# Patient Record
Sex: FEMALE
[De-identification: names, ages, dates, MRNs, and addresses within clinical notes are randomized per-mention and may not be internally consistent; named-entity substitution may affect disease eponyms.]

---

## 2019-10-30 ENCOUNTER — APPOINTMENT (OUTPATIENT)
Dept: ORTHOPEDIC SURGERY | Facility: CLINIC | Age: 54
End: 2019-10-30
Payer: COMMERCIAL

## 2019-10-30 ENCOUNTER — APPOINTMENT (OUTPATIENT)
Dept: ORTHOPEDIC SURGERY | Facility: CLINIC | Age: 54
End: 2019-10-30

## 2019-10-30 VITALS
HEIGHT: 65 IN | BODY MASS INDEX: 42.65 KG/M2 | WEIGHT: 256 LBS | DIASTOLIC BLOOD PRESSURE: 84 MMHG | SYSTOLIC BLOOD PRESSURE: 144 MMHG | HEART RATE: 76 BPM

## 2019-10-30 DIAGNOSIS — Z78.9 OTHER SPECIFIED HEALTH STATUS: ICD-10-CM

## 2019-10-30 PROBLEM — Z00.00 ENCOUNTER FOR PREVENTIVE HEALTH EXAMINATION: Noted: 2019-10-30

## 2019-10-30 PROBLEM — Z00.00 ENCOUNTER FOR PREVENTIVE HEALTH EXAMINATION: Status: ACTIVE | Noted: 2019-10-30

## 2019-10-30 PROCEDURE — 99204 OFFICE O/P NEW MOD 45 MIN: CPT

## 2019-10-30 RX ORDER — CHROMIUM 200 MCG
TABLET ORAL
Refills: 0 | Status: ACTIVE | COMMUNITY

## 2019-10-30 RX ORDER — DORZOLAMIDE HYDROCHLORIDE 20 MG/ML
SOLUTION OPHTHALMIC
Refills: 0 | Status: ACTIVE | COMMUNITY

## 2019-10-30 RX ORDER — AMLODIPINE BESYLATE 5 MG/1
TABLET ORAL
Refills: 0 | Status: ACTIVE | COMMUNITY

## 2019-10-30 NOTE — DISCUSSION/SUMMARY
[de-identified] : BIlateral Knee moderate degenerative joint disease \par The natural history and treatment of degenerative arthritis was discussed with the patient at length today. The spectrum of treatment including nonoperative modalities to surgical intervention was elucidated. Noninvasive and nonoperative treatment modalities include weight reduction, activity modification with low impact exercise,  as needed use of acetaminophen or anti-inflammatory medications if tolerated, glucosamine/chondroitin supplements, and physical therapy. Further treatments can include corticosteroid injection and the use of viscosupplementation with hyaluronic acid injections. Definitive surgical treatment can certainly include total joint arthroplasty also. The risks and benefits of each treatment options was discussed and all questions were answered.\par The patient was informed of the findings and recommended conservative management in the form of a home exercise program, activity modifications, stationary bicycling, swimming and weight loss program. A trial of Glucosamine and Chondroiten Sulphate was recommended.\par A prescription for a course of physical therapy was provided.\par A prescription for non-steroidal anti-inflammatory medication Diclofenac was provided and the risks, benefits and side effects were discussed.\par Follow-up appointment was recommended in 3 months.\par

## 2019-10-30 NOTE — PHYSICAL EXAM
[Antalgic] : antalgic [LE] : Sensory: Intact in bilateral lower extremities [Knee] : patellar 2+ and symmetric bilaterally [Ankle] : ankle 2+ and symmetric bilaterally [DP] : dorsalis pedis 2+ and symmetric bilaterally [PT] : posterior tibial 2+ and symmetric bilaterally [de-identified] : On general examination the patient is adequately groomed and nourished. The vital parameters are as recorded. \par There is no lymphedema or diffuse swelling, no varicose veins, no skin warmth/erythema/scars/swelling, no ulcers and no palpable lymph nodes or masses in both lower extremities. Bilateral pedal pulses are well palpable.\par Upper Extremity:\par Both right and left upper extremities are unremarkable in terms of skin rash, lesions, pigmentation, redness, tenderness, swelling, joint instability, abnormal deformity or crepitus. The overall range of motion, sensation, motor tone and strength testing are normal.\par \par Bilateral Knee Exam: \par The gait is bilateral stiff knee antalgic.\par Knee alignment:            Right 5 degrees varus with no flexion deformity. \par Left 5 degrees varus with no flexion deformity.\par Both knees demonstrate no scars and the skin has no warmth, erythema, swelling or tenderness. \par Both knees have a range of motion of\par Extension:                    Right 0 degrees     Left 0 degrees\par Flexion:                                   Right 110 degrees      Left 110 degrees\par There is right knee medial joint line tenderness, Left Knee Lateral joint line pain. There is bilateral knee mild effusion. \par Wyatt's test is positive. Mikel test is positive.\par Lachman's test, Anterior/Posterior Drawer test and Pivot Shift Tests are negative. \par There is bilateral knee grade 1 MCL mediolateral laxity and no anteroposterior instability. \par Patella compression test is positive and patellofemoral tracking is normal with no lateral subluxation, apprehension or instability. \par Right knee quadriceps and hamstrings power is 4+.\par Left knee quadriceps and hamstrings power is 4+.\par \par Hip Exam:\par The gait and station is normal\par The patient has equal leg lengths and no pelvic tilt. Gal/Trinity test is 7 inches on the right and 7 inches on the left. Active SLR is 60 degrees on the right and 60 degrees on the left. Both hips demonstrate no scars and the skin has no signs of inflammation or tenderness. \par Both Hips have a normal range of motion of flexion to 100 degrees, abduction 40 degrees, adduction 20 degrees, external rotation 40 degrees, internal rotation 20 degrees with symmetrical motion in flexion and extension. There is no flexion contracture, deformity or instability. Labral impingement tests are negative.\par Both hips flexor, abductor and extensor power is normal.\par \par Spine: There is no curvature of the spine or loss of normal lumbar lordosis. The skin is devoid of erythema, scars, pigmentation or rashes. There is no lumbar spasm and no tenderness especially at L4-L5 or L5-S1. \par The range of motion of the lumbar spine is normal and painless in flexion, extension, lateral flexion and rotation. There is no lumbar spine imbalance or instability detected.\par Bilateral passive SLR is 60 degrees in supine and sitting positions. Lasegue's Test is negative.\par Neurology:\par The patient is alert and oriented in person, place and time. The mood is calm and affect is normal.\par Testing for coordination including Rhomberg's Test and Finger-Nose Test, sensation, motor tone and power and deep tendon reflexes in both lower extremities is normal. [de-identified] : The following radiographs were taken recently at [facility] and read by me during this patients visit. I reviewed each radiograph in detail with the patient and discussed the findings as highlighted below. \par AP, lateral and skyline views of the bilateral knees confirm moderate degenerative joint disease with medial joint space narrowing and osteophyte formation, right worse than left on xray. \par

## 2019-10-30 NOTE — HISTORY OF PRESENT ILLNESS
[7] : an average pain level of 7/10 [de-identified] : Ms. VINNY VILLALOBOS is a 54 year old female presenting with bilateral knee pain, now worsening.  She localizes the pain to the medial and anterior aspect of the right knee, and to the lateral and anterior aspect of the left knee. The patient describes the pain as sharp, and states it is intermittent, based on activity. She states the pain is exacerbated by walking long distance, climbing/descending stairs, and rising from a seated position. She has been taking NSAIDs for pain with only mild temporary relief. She admits to prior conservative management inclusive of physical therapy with only mild improvement in condition. Patient has not had injections. She denies hip or lower back pain. The patient admits to limitations in their quality of life, and is present to discuss options for treatment. JTM. \par

## 2020-05-12 ENCOUNTER — RX RENEWAL (OUTPATIENT)
Age: 55
End: 2020-05-12

## 2020-05-12 RX ORDER — DICLOFENAC SODIUM 75 MG/1
75 TABLET, DELAYED RELEASE ORAL
Qty: 60 | Refills: 0 | Status: ACTIVE | COMMUNITY
Start: 2019-10-30 | End: 1900-01-01

## 2020-07-30 ENCOUNTER — APPOINTMENT (OUTPATIENT)
Dept: ORTHOPEDIC SURGERY | Facility: CLINIC | Age: 55
End: 2020-07-30
Payer: COMMERCIAL

## 2020-07-30 VITALS — TEMPERATURE: 97.8 F | HEART RATE: 89 BPM | SYSTOLIC BLOOD PRESSURE: 140 MMHG | DIASTOLIC BLOOD PRESSURE: 81 MMHG

## 2020-07-30 DIAGNOSIS — Z78.9 OTHER SPECIFIED HEALTH STATUS: ICD-10-CM

## 2020-07-30 PROCEDURE — 99214 OFFICE O/P EST MOD 30 MIN: CPT

## 2020-07-30 PROCEDURE — 72170 X-RAY EXAM OF PELVIS: CPT

## 2020-07-30 PROCEDURE — 73562 X-RAY EXAM OF KNEE 3: CPT | Mod: 50

## 2020-07-30 RX ORDER — GABAPENTIN 100 MG/1
100 CAPSULE ORAL 3 TIMES DAILY
Qty: 90 | Refills: 0 | Status: ACTIVE | COMMUNITY
Start: 2020-07-30 | End: 1900-01-01

## 2020-08-06 PROBLEM — Z78.9 NO PERTINENT PAST MEDICAL HISTORY: Status: RESOLVED | Noted: 2020-08-06 | Resolved: 2020-08-06

## 2020-08-06 RX ORDER — MELOXICAM 15 MG/1
15 TABLET ORAL DAILY
Qty: 30 | Refills: 1 | Status: ACTIVE | COMMUNITY
Start: 2020-07-30

## 2020-08-06 NOTE — DISCUSSION/SUMMARY
[de-identified] : BIlateral Knee moderate degenerative joint disease, right knee flair up, lumbar spine DJD.\par The natural history and treatment of degenerative arthritis was discussed with the patient at length today. The spectrum of treatment including nonoperative modalities to surgical intervention was elucidated. Noninvasive and nonoperative treatment modalities include weight reduction, activity modification with low impact exercise,  as needed use of acetaminophen or anti-inflammatory medications if tolerated, glucosamine/chondroitin supplements, and physical therapy. Further treatments can include corticosteroid injection and the use of viscosupplementation with hyaluronic acid injections. Definitive surgical treatment can certainly include total joint arthroplasty also. The risks and benefits of each treatment options was discussed and all questions were answered.\par The patient was informed of the findings and recommended conservative management in the form of a home exercise program, activity modifications, stationary bicycling, swimming and weight loss program. A trial of Glucosamine and Chondroiten Sulphate was recommended.\par A prescription for a course of physical therapy was provided for her knees and lower back\par A prescription for non-steroidal anti-inflammatory medication Diclofenac along with gabapentin for nerve pain, was provided and the risks, benefits and side effects were discussed.\par Follow-up appointment was recommended in 3 months.\par

## 2020-08-06 NOTE — PHYSICAL EXAM
[Antalgic] : antalgic [Knee] : patellar 2+ and symmetric bilaterally [LE] : Sensory: Intact in bilateral lower extremities [Ankle] : ankle 2+ and symmetric bilaterally [DP] : dorsalis pedis 2+ and symmetric bilaterally [PT] : posterior tibial 2+ and symmetric bilaterally [de-identified] : On general examination the patient is adequately groomed and nourished. The vital parameters are as recorded. \par There is no lymphedema or diffuse swelling, no varicose veins, no skin warmth/erythema/scars/swelling, no ulcers and no palpable lymph nodes or masses in both lower extremities. Bilateral pedal pulses are well palpable.\par Upper Extremity:\par Both right and left upper extremities are unremarkable in terms of skin rash, lesions, pigmentation, redness, tenderness, swelling, joint instability, abnormal deformity or crepitus. The overall range of motion, sensation, motor tone and strength testing are normal.\par \par Bilateral Knee Exam: \par The gait is bilateral stiff knee antalgic.\par Knee alignment:            Right 5 degrees varus with no flexion deformity. \par Left 5 degrees varus with no flexion deformity.\par Both knees demonstrate no scars and the skin has no warmth, erythema, swelling or tenderness. \par Both knees have a range of motion of\par Extension:                    Right 0 degrees     Left 0 degrees\par Flexion:                                   Right 110 degrees      Left 110 degrees\par There is right knee medial joint line tenderness, Left Knee Lateral joint line pain. There is bilateral knee mild effusion. \par Wyatt's test is positive. Mikel test is positive.\par Lachman's test, Anterior/Posterior Drawer test and Pivot Shift Tests are negative. \par There is bilateral knee grade 1 MCL mediolateral laxity and no anteroposterior instability. \par Patella compression test is positive and patellofemoral tracking is normal with no lateral subluxation, apprehension or instability. \par Right knee quadriceps and hamstrings power is 4+.\par Left knee quadriceps and hamstrings power is 4+.\par \par Hip Exam:\par The gait and station is normal\par The patient has equal leg lengths and no pelvic tilt. Gal/Trinity test is 7 inches on the right and 7 inches on the left. Active SLR is 60 degrees on the right and 60 degrees on the left. Both hips demonstrate no scars and the skin has no signs of inflammation or tenderness. \par Both Hips have a normal range of motion of flexion to 100 degrees, abduction 40 degrees, adduction 20 degrees, external rotation 40 degrees, internal rotation 20 degrees with symmetrical motion in flexion and extension. There is no flexion contracture, deformity or instability. Labral impingement tests are negative.\par Both hips flexor, abductor and extensor power is normal.\par \par Spine Exam:\par There is mild curvature of the spine with loss of normal lumbar lordosis. The skin is devoid of erythema, scars, pigmentation or rashes. There is mild lumbar spasm and tenderness especially at L4-L5 or L5-S1. \par The range of motion of the lumbar spine is limited by pain and spasm. Forward flexion is 80% normal, extension catch positive, lateral flexion and rotation 80% normal. There is no lumbar spine imbalance or instability detected.\par Bilateral passive SLR is right 40 degrees, left 40 degrees in supine and sitting positions. Lasegue's Test is negative.\par Neurology:\par The patient is alert and oriented in person, place and time. The mood is calm and affect is normal.\par Testing for coordination including Rhomberg's Test and Finger-Nose Test, sensation, motor tone and power and deep tendon reflexes in both lower extremities is normal.\par  [de-identified] : The following radiographs were ordered and read by me during this patients visit. I reviewed each radiograph in detail with the patient and discussed the findings as highlighted below. \par AP, lateral and skyline views of the bilateral knees confirm advanced degenerative joint disease with medial joint space narrowing and osteophyte formation, with progression of disease when compared to previous imaging.\par AP view of the pelvis are within normal limits\par

## 2020-08-06 NOTE — HISTORY OF PRESENT ILLNESS
[7] : an average pain level of 7/10 [Worsening] : worsening [de-identified] : Ms. VINNY VILLALOBOS is a 54 year old female presenting with worsening right knee pain.  She was last seen in 10/2019, and treated conservatively for bilateral knee DJD. She notes her pain was mild since the last time we saw her, until recently about 9 days ago when the right knee became very inflamed and painful. She denies specific injury, but notes she is now unable to walk more than a 1/2 block, as the pain has become unbearable. She notes she has pain along the lateral aspect of the knee. . The patient describes the pain as sharp, and states it is intermittent, based on activity. She states the pain is exacerbated by walking long distance, climbing/descending stairs, and rising from a seated position. She has been taking NSAIDs for pain with only mild temporary relief. She admits to prior conservative management inclusive of physical therapy with only mild improvement in condition. Patient has not had injections. She denies hip pain. She admits to chronic lower back pain. The patient admits to limitations in their quality of life, and is present to discuss options for treatment. \par

## 2020-08-12 ENCOUNTER — APPOINTMENT (OUTPATIENT)
Dept: ORTHOPEDIC SURGERY | Facility: CLINIC | Age: 55
End: 2020-08-12

## 2020-11-06 ENCOUNTER — APPOINTMENT (OUTPATIENT)
Dept: ORTHOPEDIC SURGERY | Facility: CLINIC | Age: 55
End: 2020-11-06
Payer: COMMERCIAL

## 2020-11-06 VITALS
DIASTOLIC BLOOD PRESSURE: 88 MMHG | HEART RATE: 75 BPM | WEIGHT: 257 LBS | BODY MASS INDEX: 42.82 KG/M2 | HEIGHT: 65 IN | SYSTOLIC BLOOD PRESSURE: 132 MMHG

## 2020-11-06 DIAGNOSIS — E66.01 MORBID (SEVERE) OBESITY DUE TO EXCESS CALORIES: ICD-10-CM

## 2020-11-06 DIAGNOSIS — M47.816 SPONDYLOSIS W/OUT MYELOPATHY OR RADICULOPATHY, LUMBAR REGION: ICD-10-CM

## 2020-11-06 DIAGNOSIS — M17.0 BILATERAL PRIMARY OSTEOARTHRITIS OF KNEE: ICD-10-CM

## 2020-11-06 PROCEDURE — 99213 OFFICE O/P EST LOW 20 MIN: CPT

## 2020-11-06 PROCEDURE — 99072 ADDL SUPL MATRL&STAF TM PHE: CPT

## 2020-11-06 RX ORDER — DICLOFENAC SODIUM 75 MG/1
75 TABLET, DELAYED RELEASE ORAL
Qty: 1 | Refills: 0 | Status: ACTIVE | COMMUNITY
Start: 2020-11-06 | End: 1900-01-01

## 2020-11-06 NOTE — HISTORY OF PRESENT ILLNESS
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   She was last seen in July  and has been treated conservatively for bilateral knee DJD. She notes she was only seen once for physical therapy, but stopped as she said she did not have relief. She notes she has been using Epsom salts, along with pain medications, with no lasting relief. She notes her pain has continued since her last visit in July, with continued stiffness as well. She notes standing from a seated position is difficult, as her knees are painful and stiff. She notes she has tried meloxicam and gabapentin with mild relief, but notes she had more relief when taking diclofenac. She notes she has no back pain today. She notes most of her pain is localized to the lateral calf on the right leg with radiation along the lateral thigh. She presents for options for treatment. \par \par

## 2020-11-06 NOTE — PHYSICAL EXAM
[Antalgic] : antalgic [LE] : Sensory: Intact in bilateral lower extremities [Knee] : patellar 2+ and symmetric bilaterally [Ankle] : ankle 2+ and symmetric bilaterally [DP] : dorsalis pedis 2+ and symmetric bilaterally [PT] : posterior tibial 2+ and symmetric bilaterally [de-identified] : On general examination the patient is adequately groomed and nourished. The vital parameters are as recorded. \par There is no lymphedema or diffuse swelling, no varicose veins, no skin warmth/erythema/scars/swelling, no ulcers and no palpable lymph nodes or masses in both lower extremities. Bilateral pedal pulses are well palpable.\par Upper Extremity:\par Both right and left upper extremities are unremarkable in terms of skin rash, lesions, pigmentation, redness, tenderness, swelling, joint instability, abnormal deformity or crepitus. The overall range of motion, sensation, motor tone and strength testing are normal.\par \par Bilateral Knee Exam: \par The gait is bilateral stiff knee antalgic.\par Knee alignment:            Right 8 degrees varus with no flexion deformity. \par Left 6 degrees varus with no flexion deformity.\par Both knees demonstrate no scars and the skin has no warmth, erythema, swelling or tenderness. \par Both knees have a range of motion of\par Extension:                    Right 0 degrees     Left 0 degrees\par Flexion:                                   Right 90 degrees      Left 100 degrees\par There is right knee medial joint line tenderness, Left Knee Lateral joint line pain. There is bilateral knee mild effusion. \par Wyatt's test is positive. Mikel test is positive.\par Lachman's test, Anterior/Posterior Drawer test and Pivot Shift Tests are negative. \par There is bilateral knee grade 1 MCL mediolateral laxity and no anteroposterior instability. \par Patella compression test is positive and patellofemoral tracking is normal with no lateral subluxation, apprehension or instability. \par Right knee quadriceps and hamstrings power is 4+.\par Left knee quadriceps and hamstrings power is 4+.\par \par Hip Exam:\par The gait and station is normal\par The patient has equal leg lengths and no pelvic tilt. Gal/Trinity test is 7 inches on the right and 7 inches on the left. Active SLR is 60 degrees on the right and 60 degrees on the left. Both hips demonstrate no scars and the skin has no signs of inflammation or tenderness. \par Both Hips have a normal range of motion of flexion to 100 degrees, abduction 40 degrees, adduction 20 degrees, external rotation 40 degrees, internal rotation 20 degrees with symmetrical motion in flexion and extension. There is no flexion contracture, deformity or instability. Labral impingement tests are negative.\par Both hips flexor, abductor and extensor power is normal.\par \par Spine Exam:\par There is mild curvature of the spine with loss of normal lumbar lordosis. The skin is devoid of erythema, scars, pigmentation or rashes. There is mild lumbar spasm and tenderness especially at L4-L5 or L5-S1. \par The range of motion of the lumbar spine is limited by pain and spasm. Forward flexion is 80% normal, extension catch positive, lateral flexion and rotation 80% normal. There is no lumbar spine imbalance or instability detected.\par Bilateral passive SLR is right 40 degrees, left 40 degrees in supine and sitting positions. Lasegue's Test is negative.\par Neurology:\par The patient is alert and oriented in person, place and time. The mood is calm and affect is normal.\par Testing for coordination including Rhomberg's Test and Finger-Nose Test, sensation, motor tone and power and deep tendon reflexes in both lower extremities is normal.\par  [de-identified] : The following radiographs were taken last visit and read by me again during this patients visit. I reviewed each radiograph in detail with the patient and discussed the findings as highlighted below. \par AP, lateral and skyline views of the bilateral knees confirm advanced degenerative joint disease with medial joint space narrowing and osteophyte formation, with progression of disease when compared to previous imaging. There is bone on bone articulation medially on the right knee, left knee medial joint line narrowing is more than 75%\par AP view of the pelvis are within normal limits\par

## 2020-11-06 NOTE — DISCUSSION/SUMMARY
[de-identified] : BIlateral Knee advanced degenerative joint disease, right knee more painful and progressed, with lumbar spine DJD. \par \par The natural history and treatment of degenerative arthritis was discussed with the patient at length today. The spectrum of treatment including nonoperative modalities to surgical intervention was elucidated. Noninvasive and nonoperative treatment modalities include weight reduction, activity modification with low impact exercise,  as needed use of acetaminophen or anti-inflammatory medications if tolerated, glucosamine/chondroitin supplements, and physical therapy. Further treatments can include corticosteroid injection and the use of viscosupplementation with hyaluronic acid injections. Definitive surgical treatment can certainly include total joint arthroplasty also. The risks and benefits of each treatment options was discussed and all questions were answered.\par \par We disucssed that she will need total knee arthroplasty at some time, due to the severity of the arthritis of the right knee in particular, but at this time she has been advised she is not an ideal candidate due to BMI of over 40.  I have explained in great detail the increased risks of surgery complications in patients with a BMI over 35 including but not limited to infection, blood clots, poor wound healing, and potential implant failure. I have provided the patient with a referral to Dr. Colby for weight loss and bariatric consultation prior to discussion for surgery. \par The patient was informed of the findings and recommended conservative management in the form of a home exercise program, activity modifications, stationary bicycling, swimming and weight loss program. A trial of Glucosamine and Chondroiten Sulphate was recommended.\par A prescription for a course of physical therapy was provided for her knees, and had been recommended to go for strengthening. \par A prescription for non-steroidal anti-inflammatory medication Diclofenac, was provided and the risks, benefits and side effects were discussed.\par Follow-up appointment was recommended in 3 months with a goal weight loss of 30-35 lbs. \par

## 2024-07-10 ENCOUNTER — NON-APPOINTMENT (OUTPATIENT)
Age: 59
End: 2024-07-10